# Patient Record
(demographics unavailable — no encounter records)

---

## 2017-12-08 NOTE — EMERGENCY ROOM REPORT
History of Present Illness


General


Chief Complaint:  Pain


Source:  Patient





Present Illness


HPI


23-year-old male presents ED for evaluation.  States that he has a "boil" in 

his rectal area for the last 4 months.  Patient states the last few days he's 

noticed bleeding from the rectal area.  Mother is a nurse and told him he may 

have hemorrhoids.  Denies any pain.  States that he often has to push during 

bowel movements.  No other aggravating relieving factors.  Denies any other 

associated symptoms


Allergies:  


Coded Allergies:  


     No Known Allergies (Unverified , 5/6/13)





Patient History


Past Medical History:  none


Past Surgical History:  none


Pertinent Family History:  none


Social History:  Denies: smoking, alcohol use, drug use


Immunizations:  UTD


Reviewed Nursing Documentation:  PMH: Agreed, PSxH: Agreed





Nursing Documentation-PMH


Hx Cardiac Problems:  No - MIGARINE





Review of Systems


All Other Systems:  negative except mentioned in HPI





Physical Exam





Vital Signs








  Date Time  Temp Pulse Resp B/P (MAP) Pulse Ox O2 Delivery O2 Flow Rate FiO2


 


12/8/17 10:01 97.9 74 22 123/81 99 Room Air  








Sp02 EP Interpretation:  reviewed, normal


General Appearance:  no apparent distress, alert, GCS 15, non-toxic


Head:  normocephalic, atraumatic


Eyes:  bilateral eye normal inspection, bilateral eye PERRL


ENT:  hearing grossly normal, normal pharynx, no angioedema, normal voice


Neck:  full range of motion, supple/symm/no masses


Respiratory:  chest non-tender, lungs clear, normal breath sounds, speaking 

full sentences


Cardiovascular #1:  regular rate, rhythm, no edema


Cardiovascular #2:  2+ carotid (R), 2+ carotid (L), 2+ radial (R), 2+ radial (L)

, 2+ dorsalis pedis (R), 2+ dorsalis pedis (L)


Gastrointestinal:  normal bowel sounds, non tender, soft, non-distended, no 

guarding, no rebound


Rectal:  hemorrhoids - external nonthrombosed


Genitourinary:  normal inspection, no CVA tenderness


Musculoskeletal:  back normal, gait/station normal, normal range of motion, non-

tender


Neurologic:  alert, oriented x3, responsive, motor strength/tone normal, 

sensory intact, speech normal


Psychiatric:  judgement/insight normal, memory normal, mood/affect normal, no 

suicidal/homicidal ideation


Reflexes:  3+ bicep (R), 3+ bicep (L), 3+ tricep (R), 3+ tricep (L), 3+ knee (R)

, 3+ knee (L)


Skin:  normal color, no rash, warm/dry, well hydrated


Lymphatic:  no adenopathy





Medical Decision Making


Diagnostic Impression:  


 Primary Impression:  


 Hemorrhoids


 Qualified Codes:  K64.9 - Unspecified hemorrhoids


ER Course


Hospital Course 


23-year-old male presenting to ED complaining of rectal bleeding





Differential diagnoses include: internal hemorrhoids, external hemorrhoids, 

anal fissure, constipation





Clinical course


Patient placed on stretcher in ED.  After initial history physical exam reveals 

a young male in no acute distress.  Upon rectal exam there is good rectal tone. 

external hemorrhoids noted. nonthrombosed





I discussed findings with the patient.  Explained that treatment will include 

stools of nurse and suppositories.  Given that the hemorrhoids are not 

thrombosed and had been there for several months there is no indication for 

excision in the emergency room. Patient will require surgical consultation to 

have the hemorrhoids removed. 








Diagnosis - hemorrhoids 





Stable and discharged to home with prescription for Anusol suppository, Colace.

  Instructed to take warm soaks in top 3 times a day for 10-15 minutes.  

Patient instructed to followup with PMD/surgery.  Patient instructed to return 

to ED if symptoms recur or worsen





Last Vital Signs








  Date Time  Temp Pulse Resp B/P (MAP) Pulse Ox O2 Delivery O2 Flow Rate FiO2


 


12/8/17 10:45 97.9 78 22 128/85 99 Room Air  








Status:  improved


Disposition:  HOME, SELF-CARE


Condition:  Stable


Scripts


Docusate Sodium* (COLACE*) 100 Mg Capsule


100 MG ORAL THREE TIMES A DAY for 30 Days, CAP


   Prov: ISABEL MAYER M.D.         12/8/17 


Hydrocortisone Acetate* (ANUSOL-HC*) 25 Mg Supp.rect


1 SUPP RECTAL TWICE A DAY for 10 Days, SUPP


   Prov: ISABEL MAYER M.D.         12/8/17


Patient Instructions:  Hemorrhoids, Easy-to-Read











ISABEL MAYER M.D. Dec 8, 2017 14:02

## 2022-12-13 NOTE — EMERGENCY ROOM REPORT
History of Present Illness


General


Chief Complaint:  To Be Triaged





Present Illness


Allergies:  


Coded Allergies:  


     No Known Allergies (Unverified , 5/6/13)





Nursing Documentation-PMH


Hx Cardiac Problems:  No - MIGARINE





Medical Decision Making


Diagnostic Impression:  


 Primary Impression:  


 Patient left without being seen


ER Course


Patient left without being seen


Status:  unchanged


Disposition:  LEFT W/OUT BEING SEEN


Condition:  Unknown


Referrals:  


NOT CHOSEN IPA/MD,REFERRING (PCP)











ISABEL MAYER M.D. Oct 24, 2017 15:41 [place]